# Patient Record
Sex: FEMALE | Race: BLACK OR AFRICAN AMERICAN | Employment: FULL TIME | URBAN - METROPOLITAN AREA
[De-identification: names, ages, dates, MRNs, and addresses within clinical notes are randomized per-mention and may not be internally consistent; named-entity substitution may affect disease eponyms.]

---

## 2017-01-14 DIAGNOSIS — E78.5 HYPERLIPIDEMIA LDL GOAL <100: ICD-10-CM

## 2017-01-16 RX ORDER — ATORVASTATIN CALCIUM 20 MG/1
TABLET, FILM COATED ORAL
Qty: 90 TAB | Refills: 0 | Status: SHIPPED | OUTPATIENT
Start: 2017-01-16 | End: 2022-04-04

## 2017-01-27 RX ORDER — FUROSEMIDE 20 MG/1
TABLET ORAL
Qty: 90 TAB | Refills: 1 | Status: SHIPPED | OUTPATIENT
Start: 2017-01-27 | End: 2017-10-20 | Stop reason: ALTCHOICE

## 2017-10-20 ENCOUNTER — OFFICE VISIT (OUTPATIENT)
Dept: FAMILY MEDICINE CLINIC | Age: 42
End: 2017-10-20

## 2017-10-20 VITALS
BODY MASS INDEX: 42.69 KG/M2 | OXYGEN SATURATION: 99 % | DIASTOLIC BLOOD PRESSURE: 81 MMHG | SYSTOLIC BLOOD PRESSURE: 134 MMHG | WEIGHT: 232 LBS | HEIGHT: 62 IN | RESPIRATION RATE: 18 BRPM | TEMPERATURE: 98.2 F

## 2017-10-20 DIAGNOSIS — E78.5 HYPERLIPIDEMIA LDL GOAL <100: ICD-10-CM

## 2017-10-20 DIAGNOSIS — R30.0 DYSURIA: ICD-10-CM

## 2017-10-20 DIAGNOSIS — I50.32 DIASTOLIC CHF, CHRONIC (HCC): ICD-10-CM

## 2017-10-20 DIAGNOSIS — E11.21 CONTROLLED TYPE 2 DIABETES MELLITUS WITH DIABETIC NEPHROPATHY, WITHOUT LONG-TERM CURRENT USE OF INSULIN (HCC): ICD-10-CM

## 2017-10-20 DIAGNOSIS — N30.00 ACUTE CYSTITIS WITHOUT HEMATURIA: ICD-10-CM

## 2017-10-20 DIAGNOSIS — E03.4 HYPOTHYROIDISM DUE TO ACQUIRED ATROPHY OF THYROID: ICD-10-CM

## 2017-10-20 DIAGNOSIS — I50.32 DIASTOLIC CHF, CHRONIC (HCC): Primary | ICD-10-CM

## 2017-10-20 LAB
BILIRUB UR QL STRIP: NEGATIVE
GLUCOSE UR-MCNC: NEGATIVE MG/DL
HBA1C MFR BLD HPLC: 5.1 %
KETONES P FAST UR STRIP-MCNC: NEGATIVE MG/DL
PH UR STRIP: 6 [PH] (ref 4.6–8)
PROT UR QL STRIP: NEGATIVE MG/DL
SP GR UR STRIP: 1.02 (ref 1–1.03)
UA UROBILINOGEN AMB POC: NORMAL (ref 0.2–1)
URINALYSIS CLARITY POC: CLEAR
URINALYSIS COLOR POC: YELLOW
URINE BLOOD POC: NEGATIVE
URINE LEUKOCYTES POC: NORMAL
URINE NITRITES POC: NEGATIVE

## 2017-10-20 RX ORDER — CIPROFLOXACIN 250 MG/1
250 TABLET, FILM COATED ORAL EVERY 12 HOURS
Qty: 6 TAB | Refills: 0 | Status: SHIPPED | OUTPATIENT
Start: 2017-10-20 | End: 2017-10-23

## 2017-10-20 NOTE — TELEPHONE ENCOUNTER
Attempted to contact patient to inform her that she tested positive for a UTI and Dr. Keaton Slater sent a prescription over to her pharmacy for Cipro 250mg  lvm for patient to call office

## 2017-10-20 NOTE — MR AVS SNAPSHOT
Visit Information Date & Time Provider Department Dept. Phone Encounter #  
 10/20/2017  1:30 PM Verdis Mortimer, MD [facility] [phone] [id number] Upcoming Health Maintenance Date Due  
 EYE EXAM RETINAL OR DILATED Q1 2/19/1983 Pneumococcal 19-64 Medium Risk (1 of 1 - PPSV23) 2/19/1992 DTaP/Tdap/Td series (1 - Tdap) 2/19/1994 MICROALBUMIN Q1 9/16/2016 FOOT EXAM Q1 9/22/2016 HEMOGLOBIN A1C Q6M 12/28/2016 LIPID PANEL Q1 7/2/2017 INFLUENZA AGE 9 TO ADULT 8/1/2017 PAP AKA CERVICAL CYTOLOGY 2/16/2018 Allergies as of 10/20/2017  Review Complete On: 10/20/2017 By: Romulo Hernandez LPN No Known Allergies Current Immunizations  Never Reviewed No immunizations on file. Not reviewed this visit You Were Diagnosed With   
  
 Codes Comments Diastolic CHF, chronic (HCC)    -  Primary ICD-10-CM: I50.32 
ICD-9-CM: 428.32, 428.0 Hyperlipidemia LDL goal <100     ICD-10-CM: E78.5 ICD-9-CM: 272.4 Controlled type 2 diabetes mellitus with diabetic nephropathy, without long-term current use of insulin (HCC)     ICD-10-CM: E11.21 
ICD-9-CM: 250.40, 583.81 Dysuria     ICD-10-CM: R30.0 ICD-9-CM: 788.1 Hypothyroidism due to acquired atrophy of thyroid     ICD-10-CM: E03.4 ICD-9-CM: 244.8, 246.8 Vitals BP Pulse Temp Resp Height(growth percentile) SpO2  
 134/81 (BP 1 Location: Left arm, BP Patient Position: Sitting) (!) 161 98.2 °F (36.8 °C) (Oral) 18 5' 2\" (1.575 m) 99% OB Status Smoking Status Having regular periods Never Smoker Preferred Pharmacy Pharmacy Name [address] [facility] [phone] Your Updated Medication List  
  
   
This list is accurate as of: 10/20/17  1:55 PM.  Always use your most recent med list.  
  
  
  
  
 atorvastatin 20 mg tablet Commonly known as:  LIPITOR  
TAKE 1 TABLET DAILY bisacodyl 5 mg EC tablet Commonly known as:  DULCOLAX (BISACODYL) Take 1 tablet by mouth twice a day as needed for constipation. Comp. Stocking,Knee,Regular,Lrg Misc  
1 Package by Does Not Apply route daily. ergocalciferol 50,000 unit capsule Commonly known as:  VITAMIN D2 Take 1 Cap by mouth every seven (7) days. Indications: VITAMIN D DEFICIENCY (HIGH DOSE THERAPY) levothyroxine 150 mcg tablet Commonly known as:  SYNTHROID Take 1 Tab by mouth Daily (before breakfast). magnesium oxide 400 mg tablet Commonly known as:  MAG-OX Take 1 Tab by mouth daily. pedi multivit 43-iron fumarate 18 mg iron Chew Commonly known as:  FLINTSTONES COMPLETE (IRON) Take 2 Tabs by mouth daily. We Performed the Following AMB POC HEMOGLOBIN A1C [41602 CPT(R)] AMB POC URINALYSIS DIP STICK AUTO W/ MICRO [72376 CPT(R)] To-Do List   
 10/20/2017 Lab:  LIPID PANEL   
  
 10/20/2017 Lab:  METABOLIC PANEL, COMPREHENSIVE   
  
 10/20/2017 Lab:  TSH 3RD GENERATION Introducing [facility]! [facility] introduces SoThree patient portal. Now you can access parts of your medical record, email your doctor's office, and request medication refills online. 1. In your internet browser, go to [email or web address] 2. Click on the First Time User? Click Here link in the Sign In box. You will see the New Member Sign Up page. 3. Enter your SoThree Access Code exactly as it appears below. You will not need to use this code after youve completed the sign-up process. If you do not sign up before the expiration date, you must request a new code. · SoThree Access Code: [email or web address] Expires: 1/18/2018  1:55 PM 
 
4. Enter the last four digits of your Social Security Number (xxxx) and Date of Birth (mm/dd/yyyy) as indicated and click Submit. You will be taken to the next sign-up page. 5. Create a CaseReader ID. This will be your CaseReader login ID and cannot be changed, so think of one that is secure and easy to remember. 6. Create a CaseReader password. You can change your password at any time. 7. Enter your Password Reset Question and Answer. This can be used at a later time if you forget your password. 8. Enter your e-mail address. You will receive e-mail notification when new information is available in [address] 9. Click Sign Up. You can now view and download portions of your medical record. 10. Click the Download Summary menu link to download a portable copy of your medical information. If you have questions, please visit the Frequently Asked Questions section of the CaseReader website. Remember, CaseReader is NOT to be used for urgent needs. For medical emergencies, dial 911. Now available from your iPhone and Android! Please provide this summary of care documentation to your next provider. Your primary care clinician is listed as Gustavo Holland. If you have any questions after today's visit, please call [phone].

## 2017-10-20 NOTE — PROGRESS NOTES
Chronic Illness Visit    Today's Date: 10/20/2017  Patient's Name: Jazmyne Childress   Patient's :  1975     History:     Chief Complaint   Patient presents with    Physical     Pt. presents for regular yearly physical        Jazmyne Childress is a 38 y.o. female presenting for Chronic Care    Diabetes/Hyperlipidemia    BP today is at goal today <130/80. Patients risk factors include: morbid obesity  Home Blood Sugars are not being checked. Denies hypoglycemic episodes  Recent hospitalizations: denies  Medications regimen is as follows: not currently taking metformin  Last HgbA1C: 5.8 (16) and 5.1 (10/20/2017)  Daily exercise and diet are as follows: reports following healthy diet denies regular exercise  Weight is stable  Takes the below meds regularly with no side effects. Patient is on a statin denies myalgia  Last LDL: 112  Last DM eye exam: unknown  Last DM foot exam: unknown  Last urine microalbumin: unknown    Dysuria     Length of symptoms\" 2-3 days  Reports: burning, increased frequency. Denies: nocturia, incontinence, hematuria, pyuria, abdominal pain, fevers or chills   Treatment tried at home or over the counter meds  Recent antibiotic use? denies  History of frequent UTI?  denies  Recent sexual activity? demoes            Past Medical History:   Diagnosis Date    Anemia     Ferritin 4, 7/3/14    Depression     Diabetes ([address] 2013    Hypothyroid     Restless leg syndrome 2013    Vitamin D deficiency 7/3/14    25.4     Past Surgical History:   Procedure Laterality Date    HX TUBAL LIGATION       Social History     Social History    Marital status: SINGLE     Spouse name: N/A    Number of children: N/A    Years of education: N/A     Occupational History    mental health case management      unemployed 2014     Social History Main Topics    Smoking status: Never Smoker    Smokeless tobacco: Never Used    Alcohol use Yes      Comment: occasionally    Drug use: No    Sexual activity: Yes     Partners: Male     Birth control/ protection: Surgical, None     Other Topics Concern     Service No    Blood Transfusions No    Caffeine Concern No    Occupational Exposure No    Hobby Hazards No    Sleep Concern No    Stress Concern No    Weight Concern No    Special Diet No    Back Care No    Exercise Yes     work out class   Magness Yes     Social History Narrative     Family History   Problem Relation Age of Onset   [employer] Cancer Mother      liver    Hypertension Mother     Hypertension Father     Diabetes Father     Thyroid Disease Father      hypothyroidism    Heart Disease Maternal Grandmother      rhythm issue    Stroke Maternal Grandfather      No Known Allergies    Problem List:      Patient Active Problem List   Diagnosis Code    DM (diabetes mellitus) ([facility] E11.9    Depressed F32.9    Thyroid disease E07.9    Hypothyroidism E03.9    H. pylori infection A04.8    Hypomagnesemia E83.42    Vitamin D deficiency E55.9    Diabetes mellitus type 2, controlled ([facility] E11.9    Anemia, iron deficiency S61.4    Diastolic CHF, chronic (HCC) I50.32    Abnormal EKG R94.31       Medications:     Current Outpatient Prescriptions   Medication Sig    ciprofloxacin HCl (CIPRO) 250 mg tablet Take 1 Tab by mouth every twelve (12) hours for 3 days.  atorvastatin (LIPITOR) 20 mg tablet TAKE 1 TABLET DAILY    Comp. Stocking,Knee,Regular,Lrg misc 1 Package by Does Not Apply route daily.  levothyroxine (SYNTHROID) 150 mcg tablet Take 1 Tab by mouth Daily (before breakfast).  ergocalciferol (VITAMIN D2) 50,000 unit capsule Take 1 Cap by mouth every seven (7) days. Indications: VITAMIN D DEFICIENCY (HIGH DOSE THERAPY)    bisacodyl (DULCOLAX, BISACODYL,) 5 mg EC tablet Take 1 tablet by mouth twice a day as needed for constipation.  magnesium oxide (MAG-OX) 400 mg tablet Take 1 Tab by mouth daily.     ped multivit #43-iron fumarate (FLINTSTONES COMPLETE) 18 mg iron chew Take 2 Tabs by mouth daily. No current facility-administered medications for this visit. Constitutional: negative except for fatigue  Respiratory: negative for cough, sputum or wheezing  Cardiovascular: negative for chest pain, chest pressure/discomfort, dyspnea  Gastrointestinal: negative for nausea, vomiting, diarrhea, constipation and abdominal pain  Musculoskeletal:positive for myalgias and arthralgias  Neurological: negative for headaches and dizziness  Behavioral/Psych: negative for anxiety and depression    Physical Assessment:   VS:    Visit Vitals    /81 (BP 1 Location: Left arm, BP Patient Position: Sitting)    Temp 98.2 °F (36.8 °C) (Oral)    Resp 18    Ht 5' 2\" (1.575 m)    Wt 232 lb (105.2 kg)    SpO2 99%    BMI 42.43 kg/m2       Lab Results   Component Value Date/Time    Sodium 140 09/16/2015 10:29 AM    Potassium 3.9 09/16/2015 10:29 AM    Chloride 106 09/16/2015 10:29 AM    CO2 29 09/16/2015 10:29 AM    Anion gap 5 09/16/2015 10:29 AM    Glucose 80 09/16/2015 10:29 AM    BUN 7 09/16/2015 10:29 AM    Creatinine 0.84 09/16/2015 10:29 AM    BUN/Creatinine ratio 8 09/16/2015 10:29 AM    GFR est AA >60 09/16/2015 10:29 AM    GFR est non-AA >60 09/16/2015 10:29 AM    Calcium 8.6 09/16/2015 10:29 AM       General:   Well-groomed, well-nourished, in no distress, pleasant, alert, appropriate and conversant. Mouth:  Good dentition, oropharynx WNL without membranes, exudates, petechiae or ulcers  Cardiovasc:   RRR, no MRG. Pulses 2+ and symmetric at distal extremities. Pulmonary:   Lungs clear bilaterally. Normal respiratory effort. Abdomen:   Abdomen soft, NT, ND, NAB. Extremities:   Bilat 1+ edema, no TTP bilateral calves. LEs warm and well-perfused. Neuro:   Alert and oriented, no focal deficits. No facial asymmetry noted.   Skin:    No rashes or jaundice  MSK:   Normal ROM, 5/5 muscle strength  Psych:  No pressured speech or abnormal thought content        Assessment/Plan & Orders:       1. Diastolic CHF, chronic ([address]    2. Hyperlipidemia LDL goal <100    3. Controlled type 2 diabetes mellitus with diabetic nephropathy, without long-term current use of insulin ([address]    4. Dysuria    5. Hypothyroidism due to acquired atrophy of thyroid    6. Acute cystitis without hematuria        Orders Placed This Encounter    LIPID PANEL    METABOLIC PANEL, COMPREHENSIVE    TSH 3RD GENERATION    AMB POC HEMOGLOBIN A1C    AMB POC URINALYSIS DIP STICK AUTO W/ MICRO    ciprofloxacin HCl (CIPRO) 250 mg tablet       Diabetes HgbA1c stable on diet control only patient has been off metformin and HgbA1c was at goal. We do not want her to hypoglycemia so we will stop this medication for now.   Hypothyroidism will check TSH and see if we need to adjust synthroid dosage  Hyperlipidemia will check lipid panel ordered again  Dysuria/UTI will send in Cipro      Follow up in 2-3 months    Jatinder Romero MD  Family Medicine  10/20/2017  1:32 PM

## 2017-10-23 NOTE — TELEPHONE ENCOUNTER
Patient called in reference to her results. Patient is requesting to have her medication(Cipro) to be sent to [facility] [address].

## 2017-10-25 ENCOUNTER — TELEPHONE (OUTPATIENT)
Dept: FAMILY MEDICINE CLINIC | Age: 42
End: 2017-10-25

## 2017-10-25 RX ORDER — CIPROFLOXACIN 250 MG/1
250 TABLET, FILM COATED ORAL EVERY 12 HOURS
Qty: 20 TAB | Refills: 0 | Status: SHIPPED | OUTPATIENT
Start: 2017-10-25 | End: 2017-11-04

## 2017-10-25 NOTE — TELEPHONE ENCOUNTER
Patient was advised that the Cipro was called into requested pharmacy  [facility] on [address] [address] ready for pick-up. Patient verbalized understanding of instructions.

## 2017-10-30 ENCOUNTER — HOSPITAL ENCOUNTER (OUTPATIENT)
Dept: LAB | Age: 42
Discharge: HOME | End: 2017-10-30

## 2017-10-30 LAB — SENTARA SPECIMEN COL,SENBCF: NORMAL

## 2017-10-30 PROCEDURE — 99001 SPECIMEN HANDLING PT-LAB: CPT | Performed by: FAMILY MEDICINE

## 2017-10-31 DIAGNOSIS — E03.4 HYPOTHYROIDISM DUE TO ACQUIRED ATROPHY OF THYROID: Primary | ICD-10-CM

## 2017-10-31 RX ORDER — LEVOTHYROXINE SODIUM 200 UG/1
200 TABLET ORAL
Qty: 90 TAB | Refills: 1 | Status: SHIPPED | OUTPATIENT
Start: 2017-10-31

## 2018-06-27 ENCOUNTER — HOSPITAL ENCOUNTER (OUTPATIENT)
Dept: PHYSICAL THERAPY | Age: 43
Discharge: HOME | End: 2018-06-27
Payer: COMMERCIAL

## 2018-06-27 PROCEDURE — 97110 THERAPEUTIC EXERCISES: CPT

## 2018-06-27 PROCEDURE — 97161 PT EVAL LOW COMPLEX 20 MIN: CPT
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
## 2018-07-02 ENCOUNTER — HOSPITAL ENCOUNTER (OUTPATIENT)
Dept: PHYSICAL THERAPY | Age: 43
Discharge: HOME | End: 2018-07-02
Payer: COMMERCIAL

## 2018-07-02 PROCEDURE — 97110 THERAPEUTIC EXERCISES: CPT

## 2018-07-02 NOTE — PROGRESS NOTES
PT DAILY TREATMENT NOTE - [facility]     Patient Name: Lilo Cole  Date:2018  : 1975  [x]  Patient  Verified  Payor: Saumya Martinez / Plan: Julia Wong / Product Type:  Workers Comp /    In [employer] time:6:00  Total Treatment Time (min): 52  Total Timed Codes (min): 42  1:1 Treatment Time ( only): 39   Visit #: 2 of 10    Treatment Area: Cervicalgia [M54.2]  Low back pain [M54.5]    SUBJECTIVE  Pain Level (0-10 scale): 4  Any medication changes, allergies to medications, adverse drug reactions, diagnosis change, or new procedure performed?: [x] No    [] Yes (see summary sheet for update)  Subjective functional status/changes:   [] No changes reported  Patient reports most of her pain in low back today    OBJECTIVE    Modality rationale: decrease edema, decrease pain and increase tissue extensibility to improve the patients ability to increase tolerance to ADLs   Min Type Additional Details    [] Estim:  []Unatt       []IFC  []Premod                        []Other:  []w/ice   []w/heat  Position:  Location:    [] Estim: []Att    []TENS instruct  []NMES                    []Other:  []w/US   []w/ice   []w/heat  Position:  Location:    []  Traction: [] Cervical       []Lumbar                       [] Prone          []Supine                       []Intermittent   []Continuous Lbs:  [] before manual  [] after manual    []  Ultrasound: []Continuous   [] Pulsed                           []1MHz   []3MHz W/cm2:  Location:    []  Iontophoresis with dexamethasone         Location: [] Take home patch   [] In clinic   10 []  Ice     [x]  heat  []  Ice massage  []  Laser   []  Anodyne Position:  Location: low back    []  Laser with stim  []  Other:  Position:  Location:    []  Vasopneumatic Device Pressure:       [] lo [] med [] hi   Temperature: [] lo [] med [] hi   [] Skin assessment post-treatment:  []intact []redness- no adverse reaction    []redness  adverse reaction:       42 min Therapeutic Exercise:  [x] See flow sheet :   Rationale: increase ROM, increase strength and increase proprioception to improve the patients ability to perform full duties at work              With   [] TE   [] TA   [] neuro   [] other: Patient Education: [x] Review HEP    [] Progressed/Changed HEP based on:   [] positioning   [] body mechanics   [] transfers   [] heat/ice application    [] other:      Other Objective/Functional Measures: Require VCs throughout for proper form  Tolerated initiation in therex well with the exception of pain during shoulder shrugs (did not perform today). Pain Level (0-10 scale) post treatment: 4    ASSESSMENT/Changes in Function: Patient continues to show improvements with signs/ symptoms however still demonstrates a decrease in strength, impaired ROM, and an increase in pain with functional activities. Patient will continue to benefit from skilled PT services to modify and progress therapeutic interventions, address functional mobility deficits, address ROM deficits, address strength deficits, analyze and cue movement patterns and analyze and modify body mechanics/ergonomics to attain remaining goals. [x]  See Plan of Care  []  See progress note/recertification  []  See Discharge Summary         Progress towards goals / Updated goals:  Short Term Goals: To be accomplished in 3 treatments:                         1 patient will have established and be I with HEP to aid with progression of skilled PT program                         EVAL issued                         CURRENT                         2 patient will have pain 5/10 to aid with increase tolerance to ADLS and household chores                         EVAL 7                         CURRENT: 4 7/2/18     Long Term Goals:  To be accomplished in 6 treatments:                1  patient will have pain 3/10 to aid with increase tolerance to ADLS and household chores                         EVAL 7                         CURRENT 2 patient will report overall 50% improvement to aid with increase tolerance to ADLS and activities                         EVAL                          CURRENT                         3 patient will have FOTO improved to 67 to show increase tolerance to ADLS and activities                         EVAL 57                         CURRENT    PLAN  []  Upgrade activities as tolerated     [x]  Continue plan of care  []  Update interventions per flow sheet       []  Discharge due to:_  []  Other:_      Nile Basilio, PT 7/2/2018  3:32 PM    Future Appointments  Date Time Provider Charissa Douglass   7/2/2018 5:00 PM Nile Basilio, PT [facility]   7/6/2018 8:00 AM Nile Basilio, PT [facility]   7/9/2018 8:00 AM Nile Basilio, PT [facility]   7/11/2018 7:30 AM Maryanne Crowe, PT [facility]   7/13/2018 8:00 AM Maryanne Crowe, PT [facility]   10/22/2018 1:30 PM Cheron Cowden, [address]

## 2018-07-06 ENCOUNTER — HOSPITAL ENCOUNTER (OUTPATIENT)
Dept: PHYSICAL THERAPY | Age: 43
Discharge: HOME | End: 2018-07-06
Payer: COMMERCIAL

## 2018-07-06 PROCEDURE — 97110 THERAPEUTIC EXERCISES: CPT

## 2018-07-06 NOTE — PROGRESS NOTES
PT DAILY TREATMENT NOTE - [facility]     Patient Name: Singh [facility]  Date:2018  : 1975  [x]  Patient  Verified  Payor: Blaze Matta / Plan: Bridger Zhang / Product Type: Workers Comp /    In [employer] time:9:08  Total Treatment Time (min): 52  Total Timed Codes (min): 42  1:1 Treatment Time ( only): 42   Visit #: 3 of 6    Treatment Area: Cervicalgia [M54.2]  Low back pain [M54.5]    SUBJECTIVE  Pain Level (0-10 scale): 5  Any medication changes, allergies to medications, adverse drug reactions, diagnosis change, or new procedure performed?: [x] No    [] Yes (see summary sheet for update)  Subjective functional status/changes:   [] No changes reported  Reports feeling better since last therapy session and completing HEP this morning. States she continues to have pain with prolonged sitting.      OBJECTIVE    Modality rationale: decrease pain and increase tissue extensibility to improve the patients ability to ease with tolerance to ADLs   Min Type Additional Details    [] Estim:  []Unatt       []IFC  []Premod                        []Other:  []w/ice   []w/heat  Position:  Location:    [] Estim: []Att    []TENS instruct  []NMES                    []Other:  []w/US   []w/ice   []w/heat  Position:  Location:    []  Traction: [] Cervical       []Lumbar                       [] Prone          []Supine                       []Intermittent   []Continuous Lbs:  [] before manual  [] after manual    []  Ultrasound: []Continuous   [] Pulsed                           []1MHz   []3MHz W/cm2:  Location:    []  Iontophoresis with dexamethasone         Location: [] Take home patch   [] In clinic   10 []  Ice     [x]  heat  []  Ice massage  []  Laser   []  Anodyne Position:sitting  Location:low back    []  Laser with stim  []  Other:  Position:  Location:    []  Vasopneumatic Device Pressure:       [] lo [] med [] hi   Temperature: [] lo [] med [] hi   [] Skin assessment post-treatment:  []intact []redness- no adverse reaction    []redness  adverse reaction:       42 min Therapeutic Exercise:  [x] See flow sheet :   Rationale: increase ROM, increase strength, improve coordination and increase proprioception to improve the patients ability to perform full duties at work              With   [] TE   [] TA   [] neuro   [] other: Patient Education: [x] Review HEP    [] Progressed/Changed HEP based on:   [] positioning   [] body mechanics   [] transfers   [] heat/ice application    [] other:      Other Objective/Functional Measures: Shows good results to stretching therex, demonstrates desire to be in therapy     Pain Level (0-10 scale) post treatment: 5    ASSESSMENT/Changes in Function: Patient continues to show improvements with signs/ symptoms however still demonstrates a decrease in strength and an increase in pain with functional activities. Patient will continue to benefit from skilled PT services to modify and progress therapeutic interventions, address functional mobility deficits, address strength deficits, analyze and modify body mechanics/ergonomics and assess and modify postural abnormalities to attain remaining goals.      [x]  See Plan of Care  []  See progress note/recertification  []  See Discharge Summary         Progress towards goals / Updated goals:  Short Term Goals: To be accomplished in 3 treatments:                         0 patient will have established and be I with HEP to aid with progression of skilled PT program                         EVAL issued                         ZNAFGKP: met 7/6/18- reports compliance                         2 patient will have pain 5/10 to aid with increase tolerance to ADLS and household chores                         EVAL 7                         CURRENT: 4 7/2/18, 5 7/6/18      Long Term Goals: To be accomplished in 6 treatments:                1  patient will have pain 3/10 to aid with increase tolerance to ADLS and household chores                         EVAL 7                         CURRENT                 2 patient will report overall 50% improvement to aid with increase tolerance to ADLS and activities                         EVAL                          CURRENT                         3 patient will have FOTO improved to 67 to show increase tolerance to ADLS and activities                         EVAL 57                         CURRENT       PLAN  []  Upgrade activities as tolerated     [x]  Continue plan of care  []  Update interventions per flow sheet       []  Discharge due to:_  []  Other:_      Estelita Miller PT 7/6/2018  7:54 AM    Future Appointments  Date Time Provider Charissa Douglass   7/6/2018 8:00 AM [facility] [address]   7/9/2018 8:00 AM Estelita Miller PT [facility] [address]   7/11/2018 7:30 AM [facility] [address]   7/13/2018 8:00 AM [facility] [address]   10/22/2018 1:30 PM Stephanie Flanagan, [address]

## 2018-07-09 ENCOUNTER — HOSPITAL ENCOUNTER (OUTPATIENT)
Dept: PHYSICAL THERAPY | Age: 43
Discharge: HOME | End: 2018-07-09
Payer: COMMERCIAL

## 2018-07-09 PROCEDURE — 97110 THERAPEUTIC EXERCISES: CPT

## 2018-07-09 NOTE — PROGRESS NOTES
PT DAILY TREATMENT NOTE - [facility]     Patient Name: Comfort Garcse  Date:2018  : 1975  [x]  Patient  Verified  Payor: Beto Vargas / Plan: Ranjan Crimes / Product Type:  Workers Comp /    In [address]  Total Treatment Time (min): 50  Total Timed Codes (min): 40  1:1 Treatment Time ( only): 40   Visit #: 4 of 10    Treatment Area: Cervicalgia [M54.2]  Low back pain [M54.5]    SUBJECTIVE  Pain Level (0-10 scale): 2- back  Any medication changes, allergies to medications, adverse drug reactions, diagnosis change, or new procedure performed?: [x] No    [] Yes (see summary sheet for update)  Subjective functional status/changes:   [] No changes reported  Reports feeling much better today, only a little bit of pain    OBJECTIVE    Modality rationale: decrease pain and increase tissue extensibility to improve the patients ability to ease with tolerance to ADLs   Min Type Additional Details    [] Estim:  []Unatt       []IFC  []Premod                        []Other:  []w/ice   []w/heat  Position:  Location:    [] Estim: []Att    []TENS instruct  []NMES                    []Other:  []w/US   []w/ice   []w/heat  Position:  Location:    []  Traction: [] Cervical       []Lumbar                       [] Prone          []Supine                       []Intermittent   []Continuous Lbs:  [] before manual  [] after manual    []  Ultrasound: []Continuous   [] Pulsed                           []1MHz   []3MHz W/cm2:  Location:    []  Iontophoresis with dexamethasone         Location: [] Take home patch   [] In clinic   10 []  Ice     [x]  heat  []  Ice massage  []  Laser   []  Anodyne Position: sitting  Location: low back    []  Laser with stim  []  Other:  Position:  Location:    []  Vasopneumatic Device Pressure:       [] lo [] med [] hi   Temperature: [] lo [] med [] hi   [] Skin assessment post-treatment:  []intact []redness- no adverse reaction    []redness  adverse reaction:       40 min Therapeutic Exercise:  [x] See flow sheet :   Rationale: increase strength, improve balance and increase proprioception to improve the patients ability to perform daily household chores              With   [] TE   [] TA   [] neuro   [] other: Patient Education: [x] Review HEP    [] Progressed/Changed HEP based on:   [] positioning   [] body mechanics   [] transfers   [] heat/ice application    [] other:      Other Objective/Functional Measures: Tolerated increases in reps well throughout therex. Patient noticed feeling the exercises were getting easier. Pain Level (0-10 scale) post treatment: 2- reports \"feeling good\"    ASSESSMENT/Changes in Function: Patient continues to show improvements with signs/ symptoms however still demonstrates a decrease in strength and an increase in pain with functional activities. Patient will continue to benefit from skilled PT services to modify and progress therapeutic interventions, address functional mobility deficits, address strength deficits, analyze and modify body mechanics/ergonomics and assess and modify postural abnormalities to attain remaining goals.      [x]  See Plan of Care  []  See progress note/recertification  []  See Discharge Summary         Progress towards goals / Updated goals:  Short Term Goals: To be accomplished in 3 treatments:                         3 patient will have established and be I with HEP to aid with progression of skilled PT program                         EVAL issued                         AVNAUNB: met 7/6/18- reports compliance                         2 patient will have pain 5/10 to aid with increase tolerance to ADLS and household chores                         EVAL 7                         CURRENT: 4 7/2/18, 5 7/6/18      Long Term Goals: To be accomplished in 6 treatments:                1  patient will have pain 3/10 to aid with increase tolerance to ADLS and household chores                         EVAL 7                         CURRENT: 2 7/9/18                 2 patient will report overall 50% improvement to aid with increase tolerance to ADLS and activities                         EVAL                          CURRENT                         3 patient will have FOTO improved to 67 to show increase tolerance to ADLS and activities                         EVAL 57                         CURRENT    PLAN  []  Upgrade activities as tolerated     [x]  Continue plan of care  []  Update interventions per flow sheet       []  Discharge due to:_  []  Other:_      Clarence Rasmussen PT 7/9/2018  7:56 AM    Future Appointments  Date Time Provider Charissa Douglass   7/9/2018 8:00 AM Clarence Rasmussen PT [facility]   7/11/2018 7:30 AM Gabriel Bueno, PT [facility]   7/13/2018 8:00 AM Gabriel Bueno, PT [facility]   10/22/2018 1:30 PM Soledad Shields, [address]

## 2018-07-11 ENCOUNTER — HOSPITAL ENCOUNTER (OUTPATIENT)
Dept: PHYSICAL THERAPY | Age: 43
Discharge: HOME | End: 2018-07-11
Payer: COMMERCIAL

## 2018-07-11 PROCEDURE — 97110 THERAPEUTIC EXERCISES: CPT

## 2018-07-11 NOTE — PROGRESS NOTES
PT DAILY TREATMENT NOTE     Patient Name: Nicholas Beyer  Date:2018  : 1975  [x]  Patient  Verified  Payor: [employer] / Plan: [employer] / Product Type: Workers Comp /    In [place] time:825  Total Treatment Time (min): 48  Visit #: 5 of 6 on original plan,  Has 3 additional approved. Treatment Area: Cervicalgia [M54.2]  Low back pain [M54.5]    SUBJECTIVE  Pain Level (0-10 scale): 3  Any medication changes, allergies to medications, adverse drug reactions, diagnosis change, or new procedure performed?: [x] No    [] Yes (see summary sheet for update)  Subjective functional status/changes:   [] No changes reported  Doing alright, I have to go to work after this.      OBJECTIVE    Modality rationale: decrease pain and increase tissue extensibility to improve the patients ability to aid with post exercise recovery   Min Type Additional Details    [] Estim:  []Unatt       []IFC  []Premod                        []Other:  []w/ice   []w/heat  Position:  Location:    [] Estim: []Att    []TENS instruct  []NMES                    []Other:  []w/US   []w/ice   []w/heat  Position:  Location:    []  Traction: [] Cervical       []Lumbar                       [] Prone          []Supine                       []Intermittent   []Continuous Lbs:  [] before manual  [] after manual    []  Ultrasound: []Continuous   [] Pulsed                           []1MHz   []3MHz W/cm2:  Location:    []  Iontophoresis with dexamethasone         Location: [] Take home patch   [] In clinic   10 []  Ice     [x]  Heat post  []  Ice massage  []  Laser   []  Anodyne Position:supine/reclined  Location:LS    []  Laser with stim  []  Other:  Position:  Location:    []  Vasopneumatic Device Pressure:       [] lo [] med [] hi   Temperature: [] lo [] med [] hi   [] Skin assessment post-treatment:  []intact []redness- no adverse reaction    []redness  adverse reaction:      min []Eval                  []Re-Eval       38 min Therapeutic Exercise:  [x] See flow sheet :   Rationale: increase ROM, increase strength and improve coordination to improve the patients ability to aid with increase tolerance to ADLs and activities     min Therapeutic Activity:  []  See flow sheet :   Rationale:   to improve the patients ability to       min Neuromuscular Re-education:  []  See flow sheet :   Rationale:   to improve the patients ability to      min Manual Therapy:     Rationale:  to       min Gait Training:  ___ feet with ___ device on level surfaces with ___ level of assist   Rationale: With   [] TE   [] TA   [] neuro   [] other: Patient Education: [x] Review HEP    [] Progressed/Changed HEP based on:   [] positioning   [] body mechanics   [] transfers   [] heat/ice application    [] other:      Other Objective/Functional Measures: UBE increased to level 3 for 5 minutes, VC exercises and tech,     Pain Level (0-10 scale) post treatment: 3    ASSESSMENT/Changes in Function: tolerated well. Decreased pain since evaluation date. Patient will continue to benefit from skilled PT services to modify and progress therapeutic interventions, address functional mobility deficits, address ROM deficits, address strength deficits, analyze and address soft tissue restrictions, analyze and cue movement patterns, analyze and modify body mechanics/ergonomics, assess and modify postural abnormalities and instruct in home and community integration to attain remaining goals.      [x]  See Plan of Care  []  See progress note/recertification  []  See Discharge Summary         Progress towards goals / Updated goals:  Short Term Goals: To be accomplished in 3 treatments:                         9 patient will have established and be I with HEP to aid with progression of skilled PT program                         EVAL issued                         [id number]: met 7/6/18- reports compliance   7/11/18                         2 patient will have pain 5/10 to aid with increase tolerance to ADLS and household chores                         EVAL 7                         CURRENT: 4 7/2/18, 5 7/6/18  3  7/11/18      Long Term Goals: To be accomplished in 6 treatments:                1  patient will have pain 3/10 to aid with increase tolerance to ADLS and household chores                         EVAL 7                         CURRENT: 2 7/9/18    3 7/11/18                 2 patient will report overall 50% improvement to aid with increase tolerance to ADLS and activities                         EVAL                          CURRENT                         3 patient will have FOTO improved to 67 to show increase tolerance to ADLS and activities                         EVAL 57                         CURRENT recheck next session       PLAN  [x]  Upgrade activities as tolerated     [x]  Continue plan of care  []  Update interventions per flow sheet       []  Discharge due to:_  [x]  Other:_recheck survey and send progress note/ progress exercises per card.      Meghan Ely PT 7/11/2018  7:37 AM    Future Appointments  Date Time Provider Charissa Douglass   7/13/2018 8:00 AM Meghan Ely PT [facility]   10/22/2018 1:30 PM Stephanie Flanagan, [address]

## 2018-07-13 ENCOUNTER — HOSPITAL ENCOUNTER (OUTPATIENT)
Dept: PHYSICAL THERAPY | Age: 43
Discharge: HOME | End: 2018-07-13
Payer: COMMERCIAL

## 2018-07-13 PROCEDURE — 97110 THERAPEUTIC EXERCISES: CPT

## 2018-07-13 NOTE — PROGRESS NOTES
PT DAILY TREATMENT NOTE     Patient Name: French Laura  Date:2018  : 1975  [x]  Patient  Verified  Payor: Graeme Rahman / Plan: Nurys Gell / Product Type:  Workers Comp /    In [employer] time:913  Total Treatment Time (min): 68  Visit #: 6 of 6-9    Treatment Area: Cervicalgia [M54.2]  Low back pain [M54.5]    SUBJECTIVE  Pain Level (0-10 scale): 3-4  Any medication changes, allergies to medications, adverse drug reactions, diagnosis change, or new procedure performed?: [x] No    [] Yes (see summary sheet for update)  Subjective functional status/changes:   [] No changes reported  I am 35% better    OBJECTIVE    Modality rationale: decrease pain and increase tissue extensibility to improve the patients ability to aid with increase tolerance to exercises   Min Type Additional Details    [] Estim:  []Unatt       []IFC  []Premod                        []Other:  []w/ice   []w/heat  Position:  Location:    [] Estim: []Att    []TENS instruct  []NMES                    []Other:  []w/US   []w/ice   []w/heat  Position:  Location:    []  Traction: [] Cervical       []Lumbar                       [] Prone          []Supine                       []Intermittent   []Continuous Lbs:  [] before manual  [] after manual    []  Ultrasound: []Continuous   [] Pulsed                           []1MHz   []3MHz W/cm2:  Location:    []  Iontophoresis with dexamethasone         Location: [] Take home patch   [] In clinic   10 []  Ice     [x]  Heat post[]  Ice massage  []  Laser   []  Anodyne Position:  Location:    []  Laser with stim  []  Other:  Position:  Location:    []  Vasopneumatic Device Pressure:       [] lo [] med [] hi   Temperature: [] lo [] med [] hi   [] Skin assessment post-treatment:  []intact []redness- no adverse reaction    []redness  adverse reaction:      min []Eval                  []Re-Eval       58 min Therapeutic Exercise:  [x] See flow sheet :   Rationale: increase ROM, increase strength and improve coordination to improve the patients ability to aid with increase tolerance to ADLS and activities     min Therapeutic Activity:  []  See flow sheet :   Rationale:   to improve the patients ability to       min Neuromuscular Re-education:  []  See flow sheet :   Rationale:   to improve the patients ability to      min Manual Therapy:     Rationale:  to      min Gait Training:  ___ feet with ___ device on level surfaces with ___ level of assist   Rationale: With   [] TE   [] TA   [] neuro   [] other: Patient Education: [x] Review HEP    [] Progressed/Changed HEP based on:   [] positioning   [] body mechanics   [] transfers   [] heat/ice application    [] other:      Other Objective/Functional Measures: VC exercises and tech    FOTO 57 back     Pain Level (0-10 scale) post treatment: 3    ASSESSMENT/Changes in Function: tolerated well. Patient will continue to benefit from skilled PT services to modify and progress therapeutic interventions, address functional mobility deficits, address ROM deficits, address strength deficits, analyze and address soft tissue restrictions, analyze and cue movement patterns, analyze and modify body mechanics/ergonomics, assess and modify postural abnormalities and instruct in home and community integration to attain remaining goals.      [x]  See Plan of Care  []  See progress note/recertification  []  See Discharge Summary         Progress towards goals / Updated goals:  Short Term Goals: To be accomplished in 3 treatments:                         2 patient will have established and be I with HEP to aid with progression of skilled PT program                         EVAL issued                         [id number]: met 7/6/18- reports compliance   7/11/18 7/13/18                         2 patient will have pain 5/10 to aid with increase tolerance to ADLS and household chores                         EVAL 7                         CURRENT: 4 7/2/18, 5 7/6/18  3 7/11/18     3-4  7/13/18    Long Term Goals: To be accomplished in 6 treatments:                1  patient will have pain 3/10 to aid with increase tolerance to ADLS and household chores                         EVAL 7                         CURRENT: 2 7/9/18    3 7/11/18    3-4 7/13/18                 2 patient will report overall 50% improvement to aid with increase tolerance to ADLS and activities                         EVAL                          CURRENT     35%   7/13/18                         3 patient will have FOTO improved to 67 to show increase tolerance to ADLS and activities                         EVAL 57                         CURRENT  57  back 7/13/18    PLAN  [x]  Upgrade activities as tolerated     [x]  Continue plan of care  []  Update interventions per flow sheet       []  Discharge due to:_  []  Other:_      Jose Mejía, PT 7/13/2018  8:07 AM    Future Appointments  Date Time Provider Charissa Douglass   10/22/2018 1:30 PM Mercedes Acevedo, [address]

## 2018-07-13 NOTE — PROGRESS NOTES
[facility] [address], [address] [place] [address], [address] [place], [place] [address]y [address] [phone] fax    Physician Update  [x] Progress Note  [] Discharge Summary  Patient name: Thierry Nance Start of Care: 18   Referral source: Star Flowers : 1975   Medical/Treatment Diagnosis: Cervicalgia [M54.2]  Low back pain [M54.5] Onset Date:18     Prior Hospitalization: see medical history Provider#: [id number]   Medications: Verified on Patient Summary List    Comorbidities: thyroid problems  Prior Level of Function:  I all areas of activities and ADLS, working, household chores, community activities, working out, walking, dancing      Visits from [place] of Care: 6                  Missed Visits: 0    Status at Evaluation/Last Progress Note: eval  Progress towards Goals: Pain 3-4, FOTO unchanged for her back at 62, neck improved to 96. Overall she reports 35% improvement, She demonstrates good tolerance to exercises. She has exercises for her HEP. She has 3 visits remaining that are approved.    Goals: to be achieved in 3 visits remain approved                        1  patient will have pain 3/10 to aid with increase tolerance to ADLS and household chores                         EVAL 7                         CURRENT: 2 18    3 18    3-4 18                          2 patient will report overall 50% improvement to aid with increase tolerance to ADLS and activities                         EVAL                          CURRENT     35%   18                         3 patient will have FOTO improved to 67 to show increase tolerance to ADLS and activities                         EVAL 57                         CURRENT  57  back 18        ASSESSMENT/RECOMMENDATIONS:  [x]Continue therapy per initial plan/protocol at a frequency of  1-3 x per week for 3 visits   []Continue therapy with the following recommended changes:_____________________ _____________________________________________________________________  []Discontinue therapy progressing towards or have reached established goals  []Discontinue therapy due to lack of appreciable progress towards goals  []Discontinue therapy due to lack of attendance or compliance  []Await Physician's recommendations/decisions regarding therapy  []Other:________________________________________________________________    Thank you for this referral. Pedrito Mckinney, PT 7/13/2018 1:35 PM  NOTE TO PHYSICIAN:  [address] AND   FAX TO [facility]: [phone]  If you are unable to process this request in 24 hours please contact our office: [phone]    ? I have read the above report and request that my patient continue as recommended. ? I have read the above report and request that my patient continue therapy with the following changes/special instructions:_____________________________________  ? I have read the above report and request that my patient be discharged from therapy.     Physicians signature: __________________________Date: ________Time:________

## 2018-07-16 ENCOUNTER — HOSPITAL ENCOUNTER (OUTPATIENT)
Dept: PHYSICAL THERAPY | Age: 43
Discharge: HOME | End: 2018-07-16
Payer: COMMERCIAL

## 2018-07-16 PROCEDURE — 97110 THERAPEUTIC EXERCISES: CPT

## 2018-07-16 NOTE — PROGRESS NOTES
PT DAILY TREATMENT NOTE - [facility]     Patient Name: Silvestre Velasquez  Date:2018  : 1975  [x]  Patient  Verified  Payor: Trevin Oglesby / Plan: Amy Jaime / Product Type: Workers Comp /    In time:5:44  Out time:6:23  Total Treatment Time (min): 39  Total Timed Codes (min): 29  1:1 Treatment Time ( only): 29   Visit #: 7 of 10    Treatment Area: Cervicalgia [M54.2]  Low back pain [M54.5]    SUBJECTIVE  Pain Level (0-10 scale): 3  Any medication changes, allergies to medications, adverse drug reactions, diagnosis change, or new procedure performed?: [x] No    [] Yes (see summary sheet for update)  Subjective functional status/changes:   [] No changes reported  Reports feeling sore/ stiff after a concert over the weekend. Today she has began feeling better. States that her neck never really bothers her anymore, its usually her back.      OBJECTIVE    Modality rationale: decrease edema and decrease pain to improve the patients ability to ease with tolerance to ADLs   Min Type Additional Details    [] Estim:  []Unatt       []IFC  []Premod                        []Other:  []w/ice   []w/heat  Position:  Location:    [] Estim: []Att    []TENS instruct  []NMES                    []Other:  []w/US   []w/ice   []w/heat  Position:  Location:    []  Traction: [] Cervical       []Lumbar                       [] Prone          []Supine                       []Intermittent   []Continuous Lbs:  [] before manual  [] after manual    []  Ultrasound: []Continuous   [] Pulsed                           []1MHz   []3MHz W/cm2:  Location:    []  Iontophoresis with dexamethasone         Location: [] Take home patch   [] In clinic   10 []  Ice     [x]  heat  []  Ice massage  []  Laser   []  Anodyne Position:sitting  Location: low back    []  Laser with stim  []  Other:  Position:  Location:    []  Vasopneumatic Device Pressure:       [] lo [] med [] hi   Temperature: [] lo [] med [] hi   [] Skin assessment post-treatment:  []intact []redness- no adverse reaction    []redness  adverse reaction:         29 min Therapeutic Exercise:  [x] See flow sheet :   Rationale: increase ROM, increase strength, improve balance and increase proprioception to improve the patients ability to ease with tolerance to full duties at work. With   [] TE   [] TA   [] neuro   [] other: Patient Education: [x] Review HEP    [] Progressed/Changed HEP based on:   [] positioning   [] body mechanics   [] transfers   [] heat/ice application    [] other:      Other Objective/Functional Measures: Tolerated initiation of box carry and danae pushouts well. Pain Level (0-10 scale) post treatment: 3    ASSESSMENT/Changes in Function: Patient continues to show improvements with signs/ symptoms however still demonstrates a decrease in strength and an increase in pain with functional activities. Patient will continue to benefit from skilled PT services to modify and progress therapeutic interventions, address functional mobility deficits, address strength deficits, analyze and cue movement patterns, analyze and modify body mechanics/ergonomics and assess and modify postural abnormalities to attain remaining goals.      [x]  See Plan of Care  []  See progress note/recertification  []  See Discharge Summary         Progress towards goals / Updated goals:  to be achieved in 3 visits remain approved                        1  patient will have pain 3/10 to aid with increase tolerance to ADLS and household chores                         EVAL 7                         CURRENT: 2 7/9/18    3 7/11/18    3-4 7/13/18, 3 7/16/18                          2 patient will report overall 50% improvement to aid with increase tolerance to ADLS and activities                         EVAL                          CURRENT     35%   7/13/18                         3 patient will have FOTO improved to 67 to show increase tolerance to ADLS and activities                         EVAL [address] 7/13/18        PLAN  []  Upgrade activities as tolerated     [x]  Continue plan of care  []  Update interventions per flow sheet       []  Discharge due to:_  []  Other:_      Fang Koroma, PT 7/16/2018  2:03 PM    Future Appointments  Date Time Provider Charissa Douglass   7/16/2018 5:30 PM Fang Koroma PT [facility]   7/18/2018 5:30 PM Crystal Perfecto, PTA [facility]   7/23/2018 5:00 PM Crystal Perfecto, PTA [facility]   10/22/2018 1:30 PM Joseph Sanford, [address]

## 2018-07-18 ENCOUNTER — HOSPITAL ENCOUNTER (OUTPATIENT)
Dept: PHYSICAL THERAPY | Age: 43
Discharge: HOME | End: 2018-07-18
Payer: COMMERCIAL

## 2018-07-18 PROCEDURE — 97110 THERAPEUTIC EXERCISES: CPT

## 2018-07-18 PROCEDURE — 97140 MANUAL THERAPY 1/> REGIONS: CPT

## 2018-07-18 NOTE — PROGRESS NOTES
PT DAILY TREATMENT NOTE - [facility]     Patient Name: Wilhelmenia Halsted  Date:2018  : 1975  [x]  Patient  Verified  Payor: Rahul Rodriguez / Plan: Marivel Dahl / Product Type: Workers Comp /    In [facility]  Total Treatment Time (min): 50  Total Timed Codes (min): 40  1:1 Treatment Time ( only): 40  Visit #:8 of 10    Treatment Area: Cervicalgia [M54.2]  Low back pain [M54.5]    SUBJECTIVE  Pain Level (0-10 scale): Any medication changes, allergies to medications, adverse drug reactions, diagnosis change, or new procedure performed?: [x] No    [] Yes (see summary sheet for update)  Subjective functional status/changes:   [] No changes reported  Took  Muscle  Relaxer  Last  Night  To  Get  To sleep.     OBJECTIVE    Modality rationale: decrease edema, decrease inflammation, decrease pain and increase tissue extensibility to improve the patients ability to perform ADL    Min Type Additional Details    [] Estim:  []Unatt       []IFC  []Premod                        []Other:  []w/ice   []w/heat  Position:  Location:    [] Estim: []Att    []TENS instruct  []NMES                    []Other:  []w/US   []w/ice   []w/heat  Position:  Location:    []  Traction: [] Cervical       []Lumbar                       [] Prone          []Supine                       []Intermittent   []Continuous Lbs:  [] before manual  [] after manual    []  Ultrasound: []Continuous   [] Pulsed                           []1MHz   []3MHz W/cm2:  Location:    []  Iontophoresis with dexamethasone         Location: [] Take home patch   [] In clinic   10 [x]  Ice post     []  heat  []  Ice massage  []  Laser   []  Anodyne Position:prone  Location:(B) LSP    []  Laser with stim  []  Other:  Position:  Location:    []  Vasopneumatic Device Pressure:       [] lo [] med [] hi   Temperature: [] lo [] med [] hi   [x] Skin assessment post-treatment:  [x]intact []redness- no adverse reaction    []redness  adverse reaction:      min []Eval []Re-Eval       32 min Therapeutic Exercise:  [x] See flow sheet :   Rationale: increase ROM and increase strength to improve the patients ability to perform ADL     min Therapeutic Activity:  []  See flow sheet :   Rationale:   to improve the patients ability to       min Neuromuscular Re-education:  []  See flow sheet :   Rationale:   to improve the patients ability to     8 min Manual Therapy:  P/A mob (B) LSP   Rationale: decrease pain, increase ROM, increase tissue extensibility and decrease edema  to perform ADL      min Gait Training:  ___ feet with ___ device on level surfaces with ___ level of assist   Rationale: With   [x] TE   [] TA   [] neuro   [] other: Patient Education: [x] Review HEP    [] Progressed/Changed HEP based on:   [] positioning   [] body mechanics   [] transfers   [] heat/ice application    [] other:      Other Objective/Functional Measures:  TTP  At  Left  SIJ/LSP    Pain Level (0-10 scale) post treatment: .5    ASSESSMENT/Changes in Function:  Discussed  With pt on importance  Of  Using CP to decreased  Soreness and inflammation. Overall fair  Response  To each there ex. Patient will continue to benefit from skilled PT services to address functional mobility deficits, address ROM deficits, address strength deficits, analyze and address soft tissue restrictions, analyze and cue movement patterns and instruct in home and community integration to attain remaining goals.      [x]  See Plan of Care  []  See progress note/recertification  []  See Discharge Summary         Progress towards goals / Updated goals:      1  patient will have pain 3/10 to aid with increase tolerance to ADLS and household chores                         EVAL 7                         CURRENT: 2 7/9/18    3 7/11/18    3-4 7/13/18, 3 7/16/18                          2 patient will report overall 50% improvement to aid with increase tolerance to ADLS and activities                         EVAL                        VVALLLZ     35%   7/13/18                         3 patient will have FOTO improved to 67 to show increase tolerance to ADLS and activities                         EVAL 57                         CURRENT  57  back 7/13/18        PLAN  []  Upgrade activities as tolerated     [x]  Continue plan of care  []  Update interventions per flow sheet       []  Discharge due to:_  []  Other:_      Dalia Grove PTA 7/18/2018  5:41 PM    Future Appointments  Date Time Provider hospitals   7/23/2018 5:00 PM [facility]   10/22/2018 1:30 PM Mercedes Acevedo, [address]

## 2018-07-23 ENCOUNTER — HOSPITAL ENCOUNTER (OUTPATIENT)
Dept: PHYSICAL THERAPY | Age: 43
Discharge: HOME | End: 2018-07-23
Payer: COMMERCIAL

## 2018-07-23 PROCEDURE — 97110 THERAPEUTIC EXERCISES: CPT

## 2018-07-23 NOTE — PROGRESS NOTES
PT DAILY TREATMENT NOTE     Patient Name: Vito Zhong  Date:2018  : 1975  [x]  Patient  Verified  Payor: Festus Aj / Plan: Freda Day / Product Type: Workers Comp /    In time:5:30  Out time:6:00  Total Treatment Time (min): 30  Visit #: 9 of 10    Treatment Area: Cervicalgia [M54.2]  Low back pain [M54.5]    SUBJECTIVE  Pain Level (0-10 scale): 1  Any medication changes, allergies to medications, adverse drug reactions, diagnosis change, or new procedure performed?: [x] No    [] Yes (see summary sheet for update)  Subjective functional status/changes:   [] No changes reported  I  Didn't  Have  To take  Pain meds  Over  The  Weekend.     OBJECTIVE    Modality rationale: decrease edema, decrease inflammation, decrease pain and increase tissue extensibility to improve the patients ability to perform  ADL    Min Type Additional Details    [] Estim:  []Unatt       []IFC  []Premod                        []Other:  []w/ice   []w/heat  Position:  Location:    [] Estim: []Att    []TENS instruct  []NMES                    []Other:  []w/US   []w/ice   []w/heat  Position:  Location:    []  Traction: [] Cervical       []Lumbar                       [] Prone          []Supine                       []Intermittent   []Continuous Lbs:  [] before manual  [] after manual    []  Ultrasound: []Continuous   [] Pulsed                           []1MHz   []3MHz W/cm2:  Location:    []  Iontophoresis with dexamethasone         Location: [] Take home patch   [] In clinic    []  Ice     []  heat  []  Ice massage  []  Laser   []  Anodyne Position:  Location:    []  Laser with stim  []  Other:  Position:  Location:    []  Vasopneumatic Device Pressure:       [] lo [] med [] hi   Temperature: [] lo [] med [] hi   [x] Skin assessment post-treatment:  [x]intact []redness- no adverse reaction    []redness  adverse reaction:      min []Eval                  []Re-Eval       30 min Therapeutic Exercise:  [x] See flow sheet : Rationale: increase ROM and increase strength to improve the patients ability to perform ADL     min Therapeutic Activity:  []  See flow sheet :   Rationale:   to improve the patients ability to       min Neuromuscular Re-education:  []  See flow sheet :   Rationale:   to improve the patients ability to      min Manual Therapy:     Rationale:  to     min Gait Training:  ___ feet with ___ device on level surfaces with ___ level of assist   Rationale: With   [x] TE   [] TA   [] neuro   [] other: Patient Education: [x] Review HEP    [] Progressed/Changed HEP based on:   [] positioning   [] body mechanics   [] transfers   [] heat/ice application    [x] other: REASSESS GOALS/DC PROCEDURE     Other Objective/Functional Measures: FOTO:  Neck  96  LSP  56    Pain Level (0-10 scale) post treatment: 1    ASSESSMENT/Changes in Function: Mrs. Nestor Flores reports  60%  Improvement. Pain on average  3/10. FOTO  Has remained  The  Same  For  Neck  And  Decreased  By  1  Point  For LSP. Patient will continue to benefit from skilled PT services to address functional mobility deficits, address ROM deficits, address strength deficits, analyze and address soft tissue restrictions and instruct in home and community integration to attain remaining goals.      [x]  See Plan of Care  []  See progress note/recertification  []  See Discharge Summary         Progress towards goals / Updated goals:   1  patient will have pain 3/10 to aid with increase tolerance to ADLS and household chores                         EVAL 7                         CURRENT: 2 7/9/18    3 7/11/18    3-4 7/13/18, 3 7/16/18                          2 patient will report overall 50% improvement to aid with increase tolerance to ADLS and activities                         EVAL                          CURRENT     35%   7/13/18                         3 patient will have FOTO improved to 67 to show increase tolerance to ADLS and activities                         XWPU 55                         HHAJIGA  56  back 7/23/18        PLAN  []  Upgrade activities as tolerated     []  Continue plan of care  []  Update interventions per flow sheet       []  Discharge due to:_  [x]  Other:FAX DC NOTE_      Dalia Grove PTA 7/23/2018  5:34 PM    Future Appointments  Date Time Provider Charissa Douglass   10/22/2018 1:30 PM Katalina Montague, [address]

## 2018-07-24 NOTE — PROGRESS NOTES
[facility] [address], [address] [place] [address], [address] [place], [place] [address]y [phone] fax    Discharge Summary    Patient name: Jeff Ronquillo     Start of Care: 18  Referral source: Maris Mohan    : 1975  Medical/Treatment Diagnosis: Cervicalgia [M54.2]  Low back pain [M54.5]  Onset Date:18  Prior Hospitalization: see medical history   Provider#: [id number]  Comorbidities: thyroid problems  Prior Level of Function:  I all areas of activities and ADLS, working, household chores, community activities, working out, walking, dancing    Medications: Verified on Patient Summary List    Visits from [facility] of Care: 9    Missed Visits: 1  Reporting Period : 18  to 18      Summary of Care: Patient seen for 9 approved sessions. Overall she is 60% improved. She is going to attempt She has exercises for her HEP and should follow up with the MD as needed. Thank you.    Goal:patient will have established and be I with HEP to aid with progression of skilled PT program         Status at last note/certification:eval  Status at discharge: met    Goal: patient will have pain 5/10 to aid with increase tolerance to ADLS and household chores             Status at last note/certification:eval  Status at discharge: met, 1    Goal:patient will report overall 50% improvement to aid with increase tolerance to ADLS and activities            Status at last note/certification:eval  Status at discharge: met, 60%    Goal: patient will have FOTO improved to 67 to show increase tolerance to ADLS and activities        Status at last note/certification:eval  Status at discharge: met, neck 96, lumbar 56      ASSESSMENT/RECOMMENDATIONS:  [x]Discontinue therapy progressing towards or have reached established goals  []Discontinue therapy due to lack of appreciable progress towards goals  []Discontinue therapy due to lack of attendance or compliance  [x]Other:program complete, patient to attempt self management    Thank you for this referral.     Gabriel Bueno, PT 7/24/2018 10:14 AM

## 2019-06-10 ENCOUNTER — HOSPITAL ENCOUNTER (OUTPATIENT)
Dept: MAMMOGRAPHY | Age: 44
Discharge: HOME | End: 2019-06-10
Attending: INTERNAL MEDICINE
Payer: COMMERCIAL

## 2019-06-10 DIAGNOSIS — Z00.01 ENCOUNTER FOR GENERAL ADULT MEDICAL EXAMINATION WITH ABNORMAL FINDINGS: ICD-10-CM

## 2019-06-10 PROCEDURE — 77063 BREAST TOMOSYNTHESIS BI: CPT

## 2019-09-23 ENCOUNTER — HOSPITAL ENCOUNTER (OUTPATIENT)
Dept: GENERAL RADIOLOGY | Age: 44
Discharge: HOME | End: 2019-09-23
Payer: COMMERCIAL

## 2019-09-23 ENCOUNTER — HOSPITAL ENCOUNTER (OUTPATIENT)
Dept: LAB | Age: 44
End: 2019-09-23
Payer: COMMERCIAL

## 2019-09-23 ENCOUNTER — HOSPITAL ENCOUNTER (OUTPATIENT)
Dept: LAB | Age: 44
Discharge: HOME | End: 2019-09-23
Payer: COMMERCIAL

## 2019-09-23 DIAGNOSIS — Z01.818 PRE-OP EVALUATION: ICD-10-CM

## 2019-09-23 DIAGNOSIS — Z01.811 ENCOUNTER FOR PREOPERATIVE PULMONARY EXAMINATION: ICD-10-CM

## 2019-09-23 LAB
ATRIAL RATE: 56 BPM
CALCULATED P AXIS, ECG09: 39 DEGREES
CALCULATED R AXIS, ECG10: 23 DEGREES
CALCULATED T AXIS, ECG11: 4 DEGREES
DIAGNOSIS, 93000: NORMAL
P-R INTERVAL, ECG05: 148 MS
Q-T INTERVAL, ECG07: 418 MS
QRS DURATION, ECG06: 70 MS
QTC CALCULATION (BEZET), ECG08: 403 MS
VENTRICULAR RATE, ECG03: 56 BPM

## 2019-09-23 PROCEDURE — 93005 ELECTROCARDIOGRAM TRACING: CPT

## 2019-09-23 PROCEDURE — 71046 X-RAY EXAM CHEST 2 VIEWS: CPT

## 2019-09-25 ENCOUNTER — HOSPITAL ENCOUNTER (OUTPATIENT)
Dept: LAB | Age: 44
Discharge: HOME | End: 2019-09-25

## 2019-09-25 PROCEDURE — 88305 TISSUE EXAM BY PATHOLOGIST: CPT

## 2021-05-13 ENCOUNTER — TRANSCRIBE ORDER (OUTPATIENT)
Dept: SCHEDULING | Age: 46
End: 2021-05-13

## 2021-05-13 DIAGNOSIS — R19.00 INTRA-ABDOMINAL AND PELVIC SWELLING, MASS AND LUMP, UNSPECIFIED SITE: Primary | ICD-10-CM

## 2021-05-13 DIAGNOSIS — R19.06 EPIGASTRIC SWELLING, MASS OR LUMP: ICD-10-CM

## 2021-05-24 ENCOUNTER — HOSPITAL ENCOUNTER (OUTPATIENT)
Dept: CT IMAGING | Age: 46
Discharge: HOME | End: 2021-05-24
Attending: INTERNAL MEDICINE
Payer: COMMERCIAL

## 2021-05-24 DIAGNOSIS — R19.06 EPIGASTRIC SWELLING, MASS OR LUMP: ICD-10-CM

## 2021-05-24 DIAGNOSIS — R19.00 INTRA-ABDOMINAL AND PELVIC SWELLING, MASS AND LUMP, UNSPECIFIED SITE: ICD-10-CM

## 2021-05-24 PROCEDURE — 74177 CT ABD & PELVIS W/CONTRAST: CPT

## 2021-05-24 PROCEDURE — 74011000636 HC RX REV CODE- 636: Performed by: INTERNAL MEDICINE

## 2021-05-24 RX ADMIN — IOPAMIDOL 100 ML: 612 INJECTION, SOLUTION INTRAVENOUS at 09:10

## 2022-04-04 ENCOUNTER — OFFICE VISIT (OUTPATIENT)
Dept: CARDIOLOGY CLINIC | Age: 47
End: 2022-04-04
Payer: COMMERCIAL

## 2022-04-04 VITALS
WEIGHT: 254 LBS | SYSTOLIC BLOOD PRESSURE: 127 MMHG | HEART RATE: 68 BPM | BODY MASS INDEX: 46.74 KG/M2 | HEIGHT: 62 IN | DIASTOLIC BLOOD PRESSURE: 86 MMHG

## 2022-04-04 DIAGNOSIS — E66.01 SEVERE OBESITY (BMI >= 40) (HCC): ICD-10-CM

## 2022-04-04 DIAGNOSIS — I50.32 DIASTOLIC CHF, CHRONIC (HCC): Primary | ICD-10-CM

## 2022-04-04 DIAGNOSIS — Z01.810 PREOP CARDIOVASCULAR EXAM: ICD-10-CM

## 2022-04-04 DIAGNOSIS — R94.31 ABNORMAL EKG: ICD-10-CM

## 2022-04-04 PROCEDURE — 99204 OFFICE O/P NEW MOD 45 MIN: CPT | Performed by: INTERNAL MEDICINE

## 2022-04-04 PROCEDURE — 93000 ELECTROCARDIOGRAM COMPLETE: CPT | Performed by: INTERNAL MEDICINE

## 2022-04-04 RX ORDER — FUROSEMIDE 40 MG/1
40 TABLET ORAL AS NEEDED
COMMUNITY

## 2022-04-04 RX ORDER — ASCORBIC ACID 250 MG
TABLET ORAL
COMMUNITY

## 2022-04-04 RX ORDER — FERROUS SULFATE 324(65)MG
TABLET, DELAYED RELEASE (ENTERIC COATED) ORAL
COMMUNITY

## 2022-04-04 RX ORDER — VITAMIN A 2400 MCG
CAPSULE ORAL
COMMUNITY

## 2022-04-04 NOTE — PROGRESS NOTES
HISTORY OF PRESENT ILLNESS  Mellissa Harris is a 50 y.o. female. 4/22 seen as a new patient for clearance for hernia surgery due to h/o CHF. Last seen in 5699 for diastolic CHF  History of diabetes, anemia, obesity, hypothyroidism has a good functional capacity as she can go up and down the steps for 2-3 floors without getting any significant symptoms          New Patient  The history is provided by the patient. This is a recurrent problem. The current episode started more than 1 week ago. The problem occurs daily. The problem has been gradually worsening. Pertinent negatives include no chest pain, no abdominal pain, no headaches and no shortness of breath. Leg Swelling  The history is provided by the patient. This is a chronic problem. The current episode started more than 1 week ago. The problem occurs every several days (1/wk). The problem has been gradually worsening. Pertinent negatives include no chest pain, no abdominal pain, no headaches and no shortness of breath. The symptoms are aggravated by standing. The symptoms are relieved by sleep and medications (compression socks). Shortness of Breath  The history is provided by the patient. This is a chronic problem. The problem occurs intermittently. The current episode started more than 1 week ago. The problem has been resolved. Associated symptoms include leg swelling. Pertinent negatives include no fever, no headaches, no ear pain, no neck pain, no cough, no sputum production, no hemoptysis, no wheezing, no PND, no orthopnea, no chest pain, no vomiting, no abdominal pain, no rash, no leg pain and no claudication. The problem's precipitants include exercise. Associated medical issues include heart failure. Associated medical issues do not include chronic lung disease or CAD. Review of Systems   Constitutional: Negative for chills, diaphoresis, fever, malaise/fatigue and weight loss.    HENT: Negative for congestion, ear discharge, ear pain, hearing loss, nosebleeds and tinnitus. Eyes: Negative for blurred vision. Respiratory: Negative for cough, hemoptysis, sputum production, shortness of breath, wheezing and stridor. Cardiovascular: Positive for leg swelling. Negative for chest pain, palpitations, orthopnea, claudication and PND. Gastrointestinal: Negative for abdominal pain, heartburn, nausea and vomiting. Musculoskeletal: Negative for myalgias and neck pain. Skin: Negative for itching and rash. Neurological: Negative for dizziness, tingling, tremors, focal weakness, loss of consciousness, weakness and headaches. Psychiatric/Behavioral: Negative for depression and suicidal ideas. Family History   Problem Relation Age of Onset   L[place] [facility] Mother         liver    Hypertension Mother     Hypertension Father     Diabetes Father     Thyroid Disease Father         hypothyroidism    Heart Disease Maternal Grandmother         rhythm issue    Stroke Maternal Grandfather        Past Medical History:   Diagnosis Date    Anemia 2010    Ferritin 4, 7/3/14    Depression 2013    Diabetes ([address] 7/2013    Hypothyroid 2007    Restless leg syndrome 7/2013    Vitamin D deficiency 7/3/14    25.4       Past Surgical History:   Procedure Laterality Date    HX TUBAL LIGATION  1999       Social History     Tobacco Use    Smoking status: Never Smoker    Smokeless tobacco: Never Used    Tobacco comment: occ cigar   Substance Use Topics    Alcohol use: Yes     Comment: occasionally       No Known Allergies    Outpatient Medications Marked as Taking for the 4/4/22 encounter (Office Visit) with Torie Asencio MD   Medication Sig Dispense Refill    furosemide (LASIX) 40 mg tablet Take 40 mg by mouth as needed.  ferrous sulfate 324 mg (65 mg iron) tablet Take  by mouth Daily (before breakfast).  ginger, Zingiber officinalis, (ginger extract) 250 mg cap Take  by mouth.  elderberry fruit (ELDERBERRY PO) Take  by mouth.       ascorbic acid, vitamin C, (Vitamin C) 250 mg tablet Take  by mouth.  DANDELION ROOT PO Take  by mouth.  levothyroxine (SYNTHROID) 200 mcg tablet Take 1 Tab by mouth Daily (before breakfast). 90 Tab 1    Comp. Stocking,Knee,Regular,Lrg misc 1 Package by Does Not Apply route daily. (Patient taking differently: 1 Package by Does Not Apply route as needed.) 1 Package 3    bisacodyl (DULCOLAX, BISACODYL,) 5 mg EC tablet Take 1 tablet by mouth twice a day as needed for constipation. 20 Tab 0        Visit Vitals  /86 (BP 1 Location: Left upper arm, BP Patient Position: Sitting, BP Cuff Size: Large adult)   Pulse 68   Ht 5' 2\" (1.575 m)   Wt 115.2 kg (254 lb)   BMI 46.46 kg/m²     Physical Exam  Constitutional:       General: She is not in acute distress. Appearance: She is well-developed. She is obese. She is not diaphoretic. HENT:      Head: Atraumatic. Mouth/Throat:      Pharynx: No oropharyngeal exudate. Eyes:      General: No scleral icterus. Right eye: No discharge. Left eye: No discharge. Conjunctiva/sclera: Conjunctivae normal.   Neck:      Thyroid: No thyromegaly. Vascular: No JVD. Trachea: No tracheal deviation. Cardiovascular:      Rate and Rhythm: Normal rate and regular rhythm. Heart sounds: No murmur heard. No gallop. Pulmonary:      Effort: Pulmonary effort is normal. No respiratory distress. Breath sounds: Normal breath sounds. No stridor. No wheezing or rales. Chest:      Chest wall: No tenderness. Abdominal:      Palpations: Abdomen is soft. Tenderness: There is no abdominal tenderness. There is no guarding or rebound. Musculoskeletal:         General: No tenderness. Normal range of motion. Cervical back: Normal range of motion and neck supple. Right lower leg: No edema. Left lower leg: No edema. Lymphadenopathy:      Cervical: No cervical adenopathy. Skin:     General: Skin is warm.    Neurological:      Mental Status: She is alert and oriented to person, place, and time. Motor: No abnormal muscle tone. Psychiatric:         Behavior: Behavior normal.       ekg sinus rhythm with t wave inversion in inferior leads  ASSESSMENT and PLAN      Diagnoses and all orders for this visit:    1. Diastolic CHF, chronic (HCC)  -     ECHO ADULT COMPLETE; Future    2. Abnormal EKG  -     AMB POC EKG ROUTINE W/ 12 LEADS, INTER & REP    3. Severe obesity (BMI >= 40) (HCC)    4. Preop cardiovascular exam        Pertinent laboratory and test data reviewed and discussed with patient. See patient instructions also for other medical advice given    Medications Discontinued During This Encounter   Medication Reason    atorvastatin (LIPITOR) 20 mg tablet Not A Current Medication    ergocalciferol (VITAMIN D2) 50,000 unit capsule Therapy Completed    magnesium oxide (MAG-OX) 400 mg tablet Therapy Completed    ped multivit #43-iron fumarate (FLINTSTONES COMPLETE) 18 mg iron chew LIST CLEANUP       Follow-up and Dispositions    · Return in about 3 months (around 7/4/2022), or if symptoms worsen or fail to improve, for post test.       4/4/2022 CHF is well compensated NYHA class I-II. Has good functional capacity. EKG shows minor nonspecific T wave changes which are chronic since 2016. She is trying to lose weight and getting an exercise program.  Diet and exercise discussed. Mediterranean and vegan diets were printed. She will be cleared for hernia surgery with low risk.

## 2022-04-04 NOTE — PATIENT INSTRUCTIONS
Medications Discontinued During This Encounter   Medication Reason    atorvastatin (LIPITOR) 20 mg tablet Not A Current Medication    ergocalciferol (VITAMIN D2) 50,000 unit capsule Therapy Completed    magnesium oxide (MAG-OX) 400 mg tablet Therapy Completed    ped multivit #43-iron fumarate (FLINTSTONES COMPLETE) 18 mg iron chew LIST CLEANUP        Learning About the Mediterranean Diet  What is the Mediterranean diet? The Mediterranean diet is a style of eating rather than a diet plan. It features foods eaten in [place], [place], [place] and [place], and other countries along the [facility]. It emphasizes eating foods like fish, fruits, vegetables, beans, high-fiber breads and whole grains, nuts, and olive oil. This style of eating includes limited red meat, cheese, and sweets. Why choose the Mediterranean diet? A Mediterranean-style diet may improve heart health. It contains more fat than other heart-healthy diets. But the fats are mainly from nuts, unsaturated oils (such as fish oils and olive oil), and certain nut or seed oils (such as canola, soybean, or flaxseed oil). These fats may help protect the heart and blood vessels. How can you get started on the Mediterranean diet? Here are some things you can do to switch to a more Mediterranean way of eating. What to eat  · Eat a variety of fruits and vegetables each day, such as grapes, blueberries, tomatoes, broccoli, peppers, figs, olives, spinach, eggplant, beans, lentils, and chickpeas. · Eat a variety of whole-grain foods each day, such as oats, brown rice, and whole wheat bread, pasta, and couscous. · Eat fish at least 2 times a week. Try tuna, salmon, mackerel, lake trout, herring, or sardines. · Eat moderate amounts of low-fat dairy products, such as milk, cheese, or yogurt. · Eat moderate amounts of poultry and eggs.   · Choose healthy (unsaturated) fats, such as nuts, olive oil, and certain nut or seed oils like canola, soybean, and flaxseed. · Limit unhealthy (saturated) fats, such as butter, palm oil, and coconut oil. And limit fats found in animal products, such as meat and dairy products made with whole milk. Try to eat red meat only a few times a month in very small amounts. · Limit sweets and desserts to only a few times a week. This includes sugar-sweetened drinks like soda. The Mediterranean diet may also include red wine with your meal--1 glass each day for women and up to 2 glasses a day for men. Tips for eating at home  · Use herbs, spices, garlic, lemon zest, and citrus juice instead of salt to add flavor to foods. · Add avocado slices to your sandwich instead of oliver. · Have fish for lunch or dinner instead of red meat. Brush the fish with olive oil, and broil or grill it. · Sprinkle your salad with seeds or nuts instead of cheese. · Cook with olive or canola oil instead of butter or oils that are high in saturated fat. · Switch from 2% milk or whole milk to 1% or fat-free milk. · Dip raw vegetables in a vinaigrette dressing or hummus instead of dips made from mayonnaise or sour cream.  · Have a piece of fruit for dessert instead of a piece of cake. Try baked apples, or have some dried fruit. Tips for eating out  · Try broiled, grilled, baked, or poached fish instead of having it fried or breaded. · Ask your  to have your meals prepared with olive oil instead of butter. · Order dishes made with marinara sauce or sauces made from olive oil. Avoid sauces made from cream or mayonnaise. · Choose whole-grain breads, whole wheat pasta and pizza crust, brown rice, beans, and lentils. · Cut back on butter or margarine on bread. Instead, you can dip your bread in a small amount of olive oil. · Ask for a side salad or grilled vegetables instead of french fries or chips. Where can you learn more?   Go to http://www.gray.com/  Enter O407 in the search box to learn more about \"Learning About the Mediterranean Diet. \"  Current as of: September 8, 2021               Content Version: 13.2  © 5300-5941 [employer]. Care instructions adapted under license by [employer] (which disclaims liability or warranty for this information). If you have questions about a medical condition or this instruction, always ask your healthcare professional. [address] any warranty or liability for your use of this information. Learning About a Vegan Diet  What is it? A vegan (say \"VEE-gun\" or \"VAY-gun\") diet is a type of vegetarian diet. Besides not eating meat, vegans don't eat food that comes from animals in any way. That includes milk products, eggs, honey, and gelatin (which comes from bones and other animal tissue). Why eat a vegan diet? There are many reasons why some people choose a vegan diet. · It can be healthier than other diets. · Some people think it's wrong to use animals for food. · Not eating meat is an important part of some religions. · A vegan diet can cost less than a diet that includes meat. · Eating less meat can be better for the environment. How do you eat a healthy vegan diet? A healthy vegan diet includes mostly whole foods and less-processed foods in each meal. A vegan diet can give you most of the nutrients you need. As long as you eat a variety of foods, there are only a few things you need to pay special attention to. Calcium. Foods that have calcium include certain legumes, certain leafy green vegetables, nuts, seeds, and tofu. Calcium-fortified breakfast cereals, nut milks, and orange juice are also good choices. Vitamin D. Getting enough calcium and vitamin D is important to keep bones strong. Vegans can have nut milks, breakfast cereals, and other foods with added vitamin D. Iron. Getting enough iron isn't a problem if you eat a wide variety of food.  Vegan iron sources include cooked dried beans, peas, and lentils; leafy green vegetables; and iron-fortified grain products. Eating foods rich in vitamin C will help your body absorb iron. Vitamin B12. Vitamin B12 is found only in foods that come from animal sources. Vegans need to eat foods that are fortified with this vitamin (such as nut milks and breakfast cereals) or take a supplement that contains it. Zinc.  Vegan sources of zinc include whole-grain breads, beans and lentils, soy foods, and vegetables. Omega-3 fatty acids. Vegan sources of omega-3 fatty acids include hemp seeds, flaxseeds, pumpkin seeds, walnuts, certain leafy green vegetables, soybean oil, and canola oil. How can you eat a healthy vegan diet during pregnancy? A balanced vegan diet can give you most of the nutrients you need for a healthy pregnancy. Pay special attention to getting enough protein, calcium, vitamin D, zinc, iron, and vitamin B12. These nutrients help in the growth of your baby's cells, brain, and organs. Vitamin B12 is found only in foods that come from animal sources. So you'll need to eat foods that have vitamin B12 added (such as plant-based milk and breakfast cereal) or take a supplement that contains it. Think about working with a dietitian to be sure you are eating a balanced diet. Taking prenatal vitamins while pregnant is an easy way to make sure that you get the right vitamins and minerals. And starting a folic acid supplement when you're trying to get pregnant is helpful too. How can you get more protein on a vegan diet? Protein is made of building blocks called amino acids. The human body can make some of these amino acids. But you must get the nine essential amino acids from food. Protein isn't just found in meat. If you are looking for alternatives to meat, the following foods are equal to about 1 oz of meat.   · ¼ cup cooked beans, peas, or lentils  · ¼ cup tofu (about 2 ounces)  · 2 Tbsp hummus  · ½ ounce nuts or seeds (for example, 12 almonds or 7 walnut halves)  · 1 Tbsp peanut butter or other nut or seed butter  You can get more protein in your food by adding high-protein ingredients. For example, you can:  · Add powdered protein to fruit smoothies and cooked cereal.  · Add beans to soup and chili. · Add nuts, seeds, or wheat germ to vegan yogurt. · Spread peanut butter onto a banana. You can also buy protein bars, drinks, and powders. Check the nutrition label for the amount of protein in each serving. Where can you learn more? Go to http://www.gray.com/  Enter V130 in the search box to learn more about \"Learning About a Vegan Diet. \"  Current as of: September 8, 2021               Content Version: 13.2  © 2006-2022 [employer]. Care instructions adapted under license by [employer] (which disclaims liability or warranty for this information). If you have questions about a medical condition or this instruction, always ask your healthcare professional. [place] [address] any warranty or liability for your use of this information.

## 2022-07-01 ENCOUNTER — OFFICE VISIT (OUTPATIENT)
Dept: CARDIOLOGY CLINIC | Age: 47
End: 2022-07-01
Payer: COMMERCIAL

## 2022-07-01 VITALS
BODY MASS INDEX: 43.79 KG/M2 | WEIGHT: 238 LBS | HEIGHT: 62 IN | HEART RATE: 70 BPM | OXYGEN SATURATION: 94 % | DIASTOLIC BLOOD PRESSURE: 83 MMHG | SYSTOLIC BLOOD PRESSURE: 124 MMHG

## 2022-07-01 DIAGNOSIS — I50.32 DIASTOLIC CHF, CHRONIC (HCC): Primary | ICD-10-CM

## 2022-07-01 DIAGNOSIS — E03.9 HYPOTHYROIDISM, UNSPECIFIED TYPE: ICD-10-CM

## 2022-07-01 DIAGNOSIS — E66.01 SEVERE OBESITY (BMI >= 40) (HCC): ICD-10-CM

## 2022-07-01 PROCEDURE — 99213 OFFICE O/P EST LOW 20 MIN: CPT | Performed by: INTERNAL MEDICINE

## 2022-07-01 NOTE — PATIENT INSTRUCTIONS
There are no discontinued medications. Learning About the [address] [facility] [place] Diet  What is the Mediterranean diet? The Mediterranean diet is a style of eating rather than a diet plan. It features foods eaten in [place], [place], [place] and [place], and other countries along the [place]. It emphasizes eating foods like fish, fruits, vegetables, beans, high-fiber breads and whole grains, nuts, and olive oil. This style of eating includes limited red meat, cheese, and sweets. Why choose the Mediterranean diet? A Mediterranean-style diet may improve heart health. It contains more fat than other heart-healthy diets. But the fats are mainly from nuts, unsaturated oils (such as fish oils and olive oil), and certain nut or seed oils (such as canola, soybean, or flaxseed oil). These fats may help protect the heart and blood vessels. How can you get started on the Mediterranean diet? Here are some things you can do to switch to a more Mediterranean way of eating. What to eat  · Eat a variety of fruits and vegetables each day, such as grapes, blueberries, tomatoes, broccoli, peppers, figs, olives, spinach, eggplant, beans, lentils, and chickpeas. · Eat a variety of whole-grain foods each day, such as oats, brown rice, and whole wheat bread, pasta, and couscous. · Eat fish at least 2 times a week. Try tuna, salmon, mackerel, lake trout, herring, or sardines. · Eat moderate amounts of low-fat dairy products, such as milk, cheese, or yogurt. · Eat moderate amounts of poultry and eggs. · Choose healthy (unsaturated) fats, such as nuts, olive oil, and certain nut or seed oils like canola, soybean, and flaxseed. · Limit unhealthy (saturated) fats, such as butter, palm oil, and coconut oil. And limit fats found in animal products, such as meat and dairy products made with whole milk. Try to eat red meat only a few times a month in very small amounts.   · Limit sweets and desserts to only a few times a week. This includes sugar-sweetened drinks like soda. The Mediterranean diet may also include red wine with your meal--1 glass each day for women and up to 2 glasses a day for men. Tips for eating at home  · Use herbs, spices, garlic, lemon zest, and citrus juice instead of salt to add flavor to foods. · Add avocado slices to your sandwich instead of oliver. · Have fish for lunch or dinner instead of red meat. Brush the fish with olive oil, and broil or grill it. · Sprinkle your salad with seeds or nuts instead of cheese. · Cook with olive or canola oil instead of butter or oils that are high in saturated fat. · Switch from 2% milk or whole milk to 1% or fat-free milk. · Dip raw vegetables in a vinaigrette dressing or hummus instead of dips made from mayonnaise or sour cream.  · Have a piece of fruit for dessert instead of a piece of cake. Try baked apples, or have some dried fruit. Tips for eating out  · Try broiled, grilled, baked, or poached fish instead of having it fried or breaded. · Ask your  to have your meals prepared with olive oil instead of butter. · Order dishes made with marinara sauce or sauces made from olive oil. Avoid sauces made from cream or mayonnaise. · Choose whole-grain breads, whole wheat pasta and pizza crust, brown rice, beans, and lentils. · Cut back on butter or margarine on bread. Instead, you can dip your bread in a small amount of olive oil. · Ask for a side salad or grilled vegetables instead of french fries or chips. Where can you learn more? Go to http://www.khoury.com/  Enter O407 in the search box to learn more about \"Learning About the Mediterranean Diet. \"  Current as of: September 8, 2021               Content Version: 13.2  © 1716-7257 [employer]. Care instructions adapted under license by [employer] (which disclaims liability or warranty for this information).  If you have questions about a medical condition or this instruction, always ask your healthcare professional. [place] [address] any warranty or liability for your use of this information.

## 2022-07-01 NOTE — PROGRESS NOTES
HISTORY OF PRESENT ILLNESS  Corrina Roberson is a 50 y.o. female. Follow-up of CHF, obesity    4/22 seen as a new patient for clearance for hernia surgery due to h/o CHF. Last seen in 7717 for diastolic CHF  History of diabetes, anemia, obesity, hypothyroidism has a good functional capacity as she can go up and down the steps for 2-3 floors without getting any significant symptoms          Leg Swelling  The history is provided by the patient. This is a chronic problem. The current episode started more than 1 week ago. The problem occurs every several days (1/wk). The problem has not changed since onset. Pertinent negatives include no chest pain, no abdominal pain, no headaches and no shortness of breath. The symptoms are aggravated by standing. The symptoms are relieved by sleep and medications (compression socks). Follow-up  Pertinent negatives include no chest pain, no abdominal pain, no headaches and no shortness of breath. Review of Systems   Constitutional: Negative for chills, diaphoresis, fever, malaise/fatigue and weight loss. HENT: Negative for congestion, ear discharge, ear pain, hearing loss, nosebleeds and tinnitus. Eyes: Negative for blurred vision. Respiratory: Negative for cough, hemoptysis, sputum production, shortness of breath, wheezing and stridor. Cardiovascular: Positive for leg swelling. Negative for chest pain, palpitations, orthopnea, claudication and PND. Gastrointestinal: Negative for abdominal pain, heartburn, nausea and vomiting. Musculoskeletal: Negative for myalgias and neck pain. Skin: Negative for itching and rash. Neurological: Negative for dizziness, tingling, tremors, focal weakness, loss of consciousness, weakness and headaches. Psychiatric/Behavioral: Negative for depression and suicidal ideas.      Family History   Problem Relation Age of Onset    Cancer Mother         liver    Hypertension Mother     Hypertension Father     Diabetes Father    [facility] Thyroid Disease Father         hypothyroidism    Heart Disease Maternal Grandmother         rhythm issue    Stroke Maternal Grandfather        Past Medical History:   Diagnosis Date    Anemia 2010    Ferritin 4, 7/3/14    Depression 2013    Diabetes ([address] 7/2013    Hypothyroid 2007    Restless leg syndrome 7/2013    Vitamin D deficiency 7/3/14    25.4       Past Surgical History:   Procedure Laterality Date    HX TUBAL LIGATION  1999       Social History     Tobacco Use    Smoking status: Never Smoker    Smokeless tobacco: Never Used    Tobacco comment: occ cigar   Substance Use Topics    Alcohol use: Yes     Comment: occasionally       No Known Allergies         Visit Vitals  /83   Pulse 70   Ht 5' 2\" (1.575 m)   Wt 108 kg (238 lb)   SpO2 94%   BMI 43.53 kg/m²     Physical Exam  Constitutional:       General: She is not in acute distress. Appearance: She is well-developed. She is obese. She is not diaphoretic. HENT:      Head: Atraumatic. Mouth/Throat:      Pharynx: No oropharyngeal exudate. Eyes:      General: No scleral icterus. Right eye: No discharge. Left eye: No discharge. Conjunctiva/sclera: Conjunctivae normal.   Neck:      Thyroid: No thyromegaly. Vascular: No JVD. Trachea: No tracheal deviation. Cardiovascular:      Rate and Rhythm: Normal rate and regular rhythm. Heart sounds: No murmur heard. No gallop. Pulmonary:      Effort: Pulmonary effort is normal. No respiratory distress. Breath sounds: Normal breath sounds. No stridor. No wheezing or rales. Chest:      Chest wall: No tenderness. Abdominal:      Palpations: Abdomen is soft. Tenderness: There is no abdominal tenderness. There is no guarding or rebound. Musculoskeletal:         General: No tenderness. Normal range of motion. Cervical back: Normal range of motion and neck supple. Right lower leg: No edema. Left lower leg: No edema. Lymphadenopathy:      Cervical: No cervical adenopathy. Skin:     General: Skin is warm. Neurological:      Mental Status: She is alert and oriented to person, place, and time. Motor: No abnormal muscle tone. Psychiatric:         Behavior: Behavior normal.       ekg sinus rhythm with t wave inversion in inferior leads  06/13/22    ECHO ADULT COMPLETE 06/20/2022 6/20/2022    Interpretation Summary    Left Ventricle: Normal left ventricular systolic function with a visually estimated EF of 55 - 60%. Left ventricle size is normal. Normal wall thickness. Normal wall motion. Normal diastolic function. Signed by: Michelle Sy MD on 6/20/2022  3:22 PM    ASSESSMENT and PLAN    4/4/2022 CHF is well compensated NYHA class I-II. Has good functional capacity. EKG shows minor nonspecific T wave changes which are chronic since 2016. She is trying to lose weight and getting an exercise program.  Diet and exercise discussed. Mediterranean and vegan diets were printed. She will be cleared for hernia surgery with low risk. Diagnoses and all orders for this visit:    1. Diastolic CHF, chronic ([address]    2. Severe obesity (BMI >= 40) ([facility])  -     REFERRAL TO PULMONARY DISEASE    3. Hypothyroidism, unspecified type        Pertinent laboratory and test data reviewed and discussed with patient. See patient instructions also for other medical advice given    There are no discontinued medications. Follow-up and Dispositions    · Return if symptoms worsen or fail to improve. 7/1/2022 CHF is compensated NYHA class I-II. Mild edema continues. Likely from diastolic CHF secondary to obesity and possibly sleep apnea. Will refer for sleep evaluation. Discussed diet weight and exercise with patient she understands and is trying. Mediterranean diet guidelines given again per her request.  I will follow her as needed in future.

## 2022-07-01 NOTE — PROGRESS NOTES
1. Have you been to the ER, urgent care clinic since your last visit? Hospitalized since your last visit?     no  2. Have you seen or consulted any other health care providers outside of the [address] [place] [address] since your last visit? Include any pap smears or colon screening. No     3. Since your last visit, have you had any of the following symptoms?      swelling in legs/arms. 4.  Have you had any blood work, X-rays or cardiac testing? No    5. Where do you normally have your labs drawn? 6. Do you need any refills today?   no

## 2023-04-27 ENCOUNTER — HOSPITAL ENCOUNTER (OUTPATIENT)
Facility: HOSPITAL | Age: 48
End: 2023-04-27
Payer: COMMERCIAL

## 2023-04-27 ENCOUNTER — HOSPITAL ENCOUNTER (OUTPATIENT)
Facility: HOSPITAL | Age: 48
Discharge: HOME | End: 2023-04-27
Payer: COMMERCIAL

## 2023-04-27 DIAGNOSIS — R19.00 PELVIC MASS: ICD-10-CM

## 2023-04-27 DIAGNOSIS — Z12.31 ENCOUNTER FOR SCREENING MAMMOGRAM FOR MALIGNANT NEOPLASM OF BREAST: ICD-10-CM

## 2023-04-27 PROCEDURE — 72197 MRI PELVIS W/O & W/DYE: CPT

## 2023-04-27 PROCEDURE — 6360000004 HC RX CONTRAST MEDICATION: Performed by: STUDENT IN AN ORGANIZED HEALTH CARE EDUCATION/TRAINING PROGRAM

## 2023-04-27 PROCEDURE — 77063 BREAST TOMOSYNTHESIS BI: CPT

## 2023-04-27 PROCEDURE — G0279 TOMOSYNTHESIS, MAMMO: HCPCS

## 2023-04-27 PROCEDURE — A9577 INJ MULTIHANCE: HCPCS | Performed by: STUDENT IN AN ORGANIZED HEALTH CARE EDUCATION/TRAINING PROGRAM

## 2023-04-27 RX ADMIN — GADOBENATE DIMEGLUMINE 20 ML: 529 INJECTION, SOLUTION INTRAVENOUS at 14:55

## 2023-06-22 PROBLEM — N94.6 DYSMENORRHEA: Status: ACTIVE | Noted: 2023-06-22

## 2024-11-25 ENCOUNTER — HOSPITAL ENCOUNTER (OUTPATIENT)
Facility: HOSPITAL | Age: 49
Discharge: HOME | End: 2024-11-28
Payer: COMMERCIAL

## 2024-11-25 VITALS — HEIGHT: 62 IN | BODY MASS INDEX: 49.32 KG/M2 | WEIGHT: 268 LBS

## 2024-11-25 DIAGNOSIS — N63.10 MASS OF RIGHT BREAST, UNSPECIFIED QUADRANT: ICD-10-CM

## 2024-11-25 PROCEDURE — 76642 ULTRASOUND BREAST LIMITED: CPT

## 2024-11-25 PROCEDURE — G0279 TOMOSYNTHESIS, MAMMO: HCPCS
